# Patient Record
(demographics unavailable — no encounter records)

---

## 2025-05-01 NOTE — ASSESSMENT
[FreeTextEntry1] : #ED/Male hypogonadism/Peyronies disease - unable to give estimation of curvature degree, still painful, likely still in active phase.  Patient's pain is likely neuropathic in nature and related to his uncontrolled diabetes.  Reports most recent A1c is 11. - recommend NSAIDs for pain - Testosterone, Vitamin D - patient failed sildenafil -Will start Cialis daily - Plan for Trial of Intracavernosal penile injections with Trimix with penile Doppler ultrasound (Papaverine 30mg/mL, phentolamine 1mg/mL, PGE1 10 mcg/mL), prescription sent to compounding pharmacy for test dose to be administered by MD/PA   The following was also discussed with the patient: Erectile dysfunction, its etiology, risk factors and natural history were discussed with the patient. Work-up and empiric management were discussed. From least to most invasive, treatments including behavioral changes (weight loss, exercise, improved sleep), oral PDE5i, vacuum erection device, intraurethral pellets, intracavernosal injections, and penile prosthetic implantation were described. Relevant individual risks and benefits disclosed. I explained that there are different causes for ED including psychogenic, vasculogenic, neurogenic and medication side-effect related causes. Oftentimes there are multiple causes.   The patient understands that the risks of PDE5is include facial redness, flushing, GERD, back pain, priapism, chest pain/MI, arrhythmia, dizziness, drop in BP, impaired vision and loss of hearing. He understands that the medication may take up to an hour to function and requires sexual stimulation. He was told not to take his medication within 4 hours of alpha blockers, or not at all if ever taking nitroglycerin. Both sildenafil and vardenafil, but not tadalafil, have some cross-reactivity with PDE6 and thus may produce visual side effects. He also was told to go to the ER immediately if he noticed any blindness or visual changes, or for any painful erection lasting > 4 hrs. He denies any history nitrate medication use for angina.

## 2025-05-01 NOTE — HISTORY OF PRESENT ILLNESS
[FreeTextEntry1] : NAZANIN PÉREZ is a 33 year M w/ a pmhx of T1DM presents chief complaint of erectile dysfunction. He states that this has been present for the past several months. It causes both he and his partner great stress. His erections are not adequate enough for penetrative intercourse. His erections are not modified with the degree of sexual stimulation. He states that his erections presently are often 5 out of 10 in both tumescence and rigidity, insufficient for penetration. He has difficulty maintaining an erection. He describes a normal libido. His erections are not improved with PDE5 inhibitors, Cialis 20mg prn. The patient denies fevers, chills, nausea and/or vomiting and no unexplained weight loss. In his present occupation he has no known toxin exposure. He does not smoke and drinks socially. He has no known drug allergies.  Pt reports that he also has some dorsal penile angulation. This is a new issue which recently started a several months ago. It is also occasionally painful at times. It does not prevent him from penetrative intercourse. He cannot report the degree of his curvature.  Labs PSA 0.35 5/2023  Office Visit 05/01/2025  Pt reports still having penile pain in both the flaccid and erect state.  Also still complains of dorsal penile curvature unable to give me an approximate angle.  Also reports that he did not tolerate sildenafil well due to side effects.  And still has any consistent satisfactory erections.

## 2025-06-26 NOTE — PLAN

## 2025-06-26 NOTE — HISTORY OF PRESENT ILLNESS
[FreeTextEntry1] : NAZANIN PÉREZ is a 33 year M w/ a pmhx of T1DM presents chief complaint of erectile dysfunction. He states that this has been present for the past several months. It causes both he and his partner great stress. His erections are not adequate enough for penetrative intercourse. His erections are not modified with the degree of sexual stimulation. He states that his erections presently are often 5 out of 10 in both tumescence and rigidity, insufficient for penetration. He has difficulty maintaining an erection. He describes a normal libido. His erections are not improved with PDE5 inhibitors, Cialis 20mg prn. The patient denies fevers, chills, nausea and/or vomiting and no unexplained weight loss. In his present occupation he has no known toxin exposure. He does not smoke and drinks socially. He has no known drug allergies.  Pt reports that he also has some dorsal penile angulation. This is a new issue which recently started a several months ago. It is also occasionally painful at times. It does not prevent him from penetrative intercourse. He cannot report the degree of his curvature.  Labs PSA 0.35 5/2023  Office Visit 05/01/2025 Pt reports still having penile pain in both the flaccid and erect state. Also still complains of dorsal penile curvature unable to give me an approximate angle. Also reports that he did not tolerate sildenafil well due to side effects. And still has not had any consistent satisfactory erections.  Office Visit 06/26/2025 Pt reports still having unsatisfactory erections on max dose PDE5is.  Also presents for PDUS w Trimix injection which will be dictated in a separate procedure note.

## 2025-06-26 NOTE — ASSESSMENT
[FreeTextEntry1] : #ED/Male hypogonadism/Peyronies disease - unable to give estimation of curvature degree, still painful, likely still in active phase. Patient's pain is likely neuropathic in nature and related to his uncontrolled diabetes. Reports most recent A1c is 11. - recommend NSAIDs for pain - c/w Cialis 5mg daily - Will start on Trimix 30-1-10, 20 units.  Patient understands that he should not inject more than 3-4 times per week.  He should alternate sides on his phallus.  He understands that he should only increase his dosage by 5 units at a time to a max of 50 units should he not have a satisfactory erection. He understands that if he has an erection lasting more than 4 hours he should go to his nearest ED for emergent decompression of his priapism and injection of reversal agent typically phenylephrine. Educational handout provided. - He will see me again in about 4-6 weeks to reassess symptoms and to see if his Trimix dosage needs to be adjusted - We will also try [employer] approval for his peyronies.      The following was also discussed with the patient: Erectile dysfunction, its etiology, risk factors and natural history were discussed with the patient. Work-up and empiric management were discussed. From least to most invasive, treatments including behavioral changes (weight loss, exercise, improved sleep), oral PDE5i, vacuum erection device, intraurethral pellets, intracavernosal injections, and penile prosthetic implantation were described. Relevant individual risks and benefits disclosed. I explained that there are different causes for ED including psychogenic, vasculogenic, neurogenic and medication side-effect related causes. Oftentimes there are multiple causes.  The patient understands that the risks of PDE5is include facial redness, flushing, GERD, back pain, priapism, chest pain/MI, arrhythmia, dizziness, drop in BP, impaired vision and loss of hearing. He understands that the medication may take up to an hour to function and requires sexual stimulation. He was told not to take his medication within 4 hours of alpha blockers, or not at all if ever taking nitroglycerin. Both sildenafil and vardenafil, but not tadalafil, have some cross-reactivity with PDE6 and thus may produce visual side effects. He also was told to go to the ER immediately if he noticed any blindness or visual changes, or for any painful erection lasting > 4 hrs. He denies any history nitrate medication use for angina.

## 2025-06-26 NOTE — PHYSICAL EXAM
[Penis Abnormality] : normal uncircumcised penis [Chaperone Present] : A chaperone was present in the examining room during all aspects of the physical examination [de-identified] : 2cm palpable plaque on mid shft dorsal surface, not involving urethra, nttp, no overlying skin changes, 45 degree dorsal curvature post injection [FreeTextEntry2] : Fermin Choi - PAC

## 2025-07-11 NOTE — THERAPY
[Today's Date] : [unfilled] [Other:___] : [unfilled] [_____] :  [unfilled] mg/dL [de-identified] : Omnipod dash

## 2025-07-11 NOTE — REVIEW OF SYSTEMS
[Polyuria] : polyuria [Muscle Weakness] : muscle weakness [Pain/Numbness of Digits] : pain/numbness of digits [Fatigue] : no fatigue [Decreased Appetite] : appetite not decreased [Recent Weight Gain (___ Lbs)] : no recent weight gain [Recent Weight Loss (___ Lbs)] : no recent weight loss [Fever] : no fever [Blurred Vision] : no blurred vision [Dysphagia] : no dysphagia [Neck Pain] : no neck pain [Dysphonia] : no dysphonia [Chest Pain] : no chest pain [Slow Heart Rate] : heart rate is not slow [Palpitations] : no palpitations [Fast Heart Rate] : heart rate is not fast [Lower Ext Edema] : no lower extremity edema [Shortness Of Breath] : no shortness of breath [Orthopnea] : no orthopnea [Wheezing] : no wheezing [SOB on Exertion] : no shortness of breath on exertion [Nausea] : no nausea [Constipation] : no constipation [Vomiting] : no vomiting [Diarrhea] : no diarrhea [Gas/Bloating] : no gas/bloating [Dry Skin] : no dry skin [Headaches] : no headaches [Dizziness] : no dizziness [Tremors] : no tremors

## 2025-07-11 NOTE — HISTORY OF PRESENT ILLNESS
[Dexcom] : Dexcom [Finger Stick] : Finger Stick: Yes [Hypoglycemia] : Patient is hypoglycemic. [FreeTextEntry1] : Mr. NAZANIN PÉREZ  Is  a 34 year  old male  who presented for follow up for  uncontrolled  type 1 Diabetes:     Diagnosis: 2106-2017  Current Regimen: Omnipod insulin pump ( Dash ) (lispro in pump )  Previous regimens: Toujeo  30 units in am and lispro (mariela ) 1: 10 g and correction i :40 >150 Compliance: ok , not always blosing when he bolus he can have hypoglycemia   SMBG/CGM :  using dexcom  G7  not connected  Hypoglycemia: no  Polyuria/polydipsia : yes  Weight change/BMI:  stable  Diet:  familiar with carb counting  Exercise: walking  HBa1c trend: 11%(9/2021)...per patient had labs with PCP and A1c is 12% ...5/2023 11% ....7/2025: A1c 10   Prevention   Statin: atorvastatin 10 mg not compliant with it  ACE/ARB : lisinopril 5 mg  ( Not taking it )  Eye examination:  no retinopathy  Neuropathy: yes     Nivia Chacon : PCP

## 2025-07-11 NOTE — DATA REVIEWED
[FreeTextEntry1] : 9/5/21: HBa1c 11.5% crea 0.6   AST 28  ALT 16  LDL 84 hb 15.3    5/2023: A1c 11.1%      ACR negative glucose 172  crea 0.75    7/2025   A1c  10.1ACR negative      crea 0.71   TSH 1.31  25 vit D 26

## 2025-07-11 NOTE — PHYSICAL EXAM
[Alert] : alert [Well Nourished] : well nourished [No Acute Distress] : no acute distress [Well Developed] : well developed [No Proptosis] : no proptosis [No Lid Lag] : no lid lag [Thyroid Not Enlarged] : the thyroid was not enlarged [No Thyroid Nodules] : no palpable thyroid nodules [No Respiratory Distress] : no respiratory distress [No Accessory Muscle Use] : no accessory muscle use [Clear to Auscultation] : lungs were clear to auscultation bilaterally [Normal S1, S2] : normal S1 and S2 [No Murmurs] : no murmurs [Normal Rate] : heart rate was normal [No Edema] : no peripheral edema [No Stigmata of Cushings Syndrome] : no stigmata of Cushings Syndrome [No Tremors] : no tremors [Oriented x3] : oriented to person, place, and time [Abdominal Striae] : no abdominal striae [Acanthosis Nigricans] : no acanthosis nigricans

## 2025-07-11 NOTE — THERAPY
[Today's Date] : [unfilled] [Other:___] : [unfilled] [_____] :  [unfilled] mg/dL [de-identified] : Omnipod dash

## 2025-07-11 NOTE — ASSESSMENT
[Diabetes Foot Care] : diabetes foot care [Long Term Vascular Complications] : long term vascular complications of diabetes [Carbohydrate Consistent Diet] : carbohydrate consistent diet [Importance of Diet and Exercise] : importance of diet and exercise to improve glycemic control, achieve weight loss and improve cardiovascular health [Hypoglycemia Management] : hypoglycemia management [Self Monitoring of Blood Glucose] : self monitoring of blood glucose [Retinopathy Screening] : Patient was referred to ophthalmology for retinopathy screening [FreeTextEntry1] : Mr. NAZANIN PÉREZ  Is  a 34 year  old male  who presented for follow up evaluation of uncontrolled type 1 Diabetes:   #uncontrolled type 1 DM  - high A1c at 10%,  now on Omnipod dash  insulin pump and has dexcom G7  having problems with sensor connecting to pump and staying in automode  also afraid to put in carbs as he can have hypo if he does and is active   - settings changed to the following :  Bolus Insulin: lispro   Pump Model: Omnipod 5 Basal Rate  Start Time: 12-10 am Basal: .1.3  Start Time: 10am - 12 am Basal: 1.3  units/hour     Carb Ratios changed to 1: 12  g encouraged bolusing premeal and sensitivity changed to 1: 60     - noncompliant with lipitor  - he stopped taking lisinopril , will monitor proteinuria   - f/u in 4-6 weeks with DM educator interested in switching to omnipod 5 ( discussed better control prior to switch to avoid getting out of automode ) - f/u in 3 months